# Patient Record
Sex: MALE | URBAN - METROPOLITAN AREA
[De-identification: names, ages, dates, MRNs, and addresses within clinical notes are randomized per-mention and may not be internally consistent; named-entity substitution may affect disease eponyms.]

---

## 2022-01-20 ENCOUNTER — APPOINTMENT (OUTPATIENT)
Dept: URBAN - METROPOLITAN AREA CLINIC 266 | Age: 47
Setting detail: DERMATOLOGY
End: 2022-01-20

## 2022-01-20 DIAGNOSIS — L73.0 ACNE KELOID: ICD-10-CM

## 2022-01-20 PROCEDURE — OTHER PRESCRIPTION: OTHER

## 2022-01-20 PROCEDURE — 99204 OFFICE O/P NEW MOD 45 MIN: CPT

## 2022-01-20 PROCEDURE — OTHER COUNSELING: OTHER

## 2022-01-20 PROCEDURE — OTHER PRESCRIPTION MEDICATION MANAGEMENT: OTHER

## 2022-01-20 PROCEDURE — OTHER MIPS QUALITY: OTHER

## 2022-01-20 RX ORDER — DOXYCYCLINE 40 MG/1
CAPSULE ORAL
Qty: 30 | Refills: 2 | Status: ERX | COMMUNITY
Start: 2022-01-20

## 2022-01-20 RX ORDER — CLINDAMYCIN PHOSPHATE 10 MG/ML
SOLUTION TOPICAL
Qty: 60 | Refills: 11 | Status: ERX | COMMUNITY
Start: 2022-01-20

## 2022-01-20 RX ORDER — TAZAROTENE 0.1 MG/G
CREAM CUTANEOUS
Qty: 1 | Refills: 3 | Status: CANCELLED | COMMUNITY
Start: 2022-01-20

## 2022-01-20 ASSESSMENT — LOCATION ZONE DERM: LOCATION ZONE: SCALP

## 2022-01-20 ASSESSMENT — LOCATION DETAILED DESCRIPTION DERM
LOCATION DETAILED: LEFT INFERIOR OCCIPITAL SCALP
LOCATION DETAILED: MID-OCCIPITAL SCALP
LOCATION DETAILED: RIGHT OCCIPITAL SCALP

## 2022-01-20 ASSESSMENT — LOCATION SIMPLE DESCRIPTION DERM: LOCATION SIMPLE: POSTERIOR SCALP

## 2022-01-20 NOTE — PROCEDURE: PRESCRIPTION MEDICATION MANAGEMENT
Render In Strict Bullet Format?: No
Plan: Accutane if fails
Detail Level: Zone
Initiate Treatment: Oracea daily \\nTazorac daily on neck \\nClindamycin solution daily

## 2022-02-02 ENCOUNTER — RX ONLY (RX ONLY)
Age: 47
End: 2022-02-02

## 2022-02-02 RX ORDER — TRETIONIN 1 MG/G
CREAM TOPICAL
Qty: 45 | Refills: 3 | Status: ERX | COMMUNITY
Start: 2022-02-02

## 2022-04-21 ENCOUNTER — RX ONLY (RX ONLY)
Age: 47
End: 2022-04-21

## 2022-04-21 ENCOUNTER — APPOINTMENT (OUTPATIENT)
Dept: URBAN - METROPOLITAN AREA CLINIC 266 | Age: 47
Setting detail: DERMATOLOGY
End: 2022-04-21

## 2022-04-21 DIAGNOSIS — L73.0 ACNE KELOID: ICD-10-CM

## 2022-04-21 DIAGNOSIS — L64.8 OTHER ANDROGENIC ALOPECIA: ICD-10-CM

## 2022-04-21 PROCEDURE — OTHER TREATMENT REGIMEN: OTHER

## 2022-04-21 PROCEDURE — OTHER COUNSELING: OTHER

## 2022-04-21 PROCEDURE — OTHER MIPS QUALITY: OTHER

## 2022-04-21 PROCEDURE — OTHER PRESCRIPTION MEDICATION MANAGEMENT: OTHER

## 2022-04-21 PROCEDURE — 99214 OFFICE O/P EST MOD 30 MIN: CPT

## 2022-04-21 ASSESSMENT — LOCATION DETAILED DESCRIPTION DERM
LOCATION DETAILED: MID-OCCIPITAL SCALP
LOCATION DETAILED: RIGHT OCCIPITAL SCALP
LOCATION DETAILED: LEFT INFERIOR OCCIPITAL SCALP

## 2022-04-21 ASSESSMENT — LOCATION ZONE DERM: LOCATION ZONE: SCALP

## 2022-04-21 ASSESSMENT — LOCATION SIMPLE DESCRIPTION DERM: LOCATION SIMPLE: POSTERIOR SCALP

## 2022-04-21 NOTE — PROCEDURE: PRESCRIPTION MEDICATION MANAGEMENT
Detail Level: Zone
Initiate Treatment: Minoxidil/Finasteride compound spray
Render In Strict Bullet Format?: No

## 2022-06-08 ENCOUNTER — RX ONLY (RX ONLY)
Age: 47
End: 2022-06-08

## 2022-06-08 RX ORDER — DOXYCYCLINE 40 MG/1
CAPSULE ORAL
Qty: 30 | Refills: 2 | Status: ERX

## 2022-06-10 ENCOUNTER — RX ONLY (RX ONLY)
Age: 47
End: 2022-06-10

## 2022-06-10 RX ORDER — TRETIONIN 1 MG/G
CREAM TOPICAL
Qty: 45 | Refills: 3 | Status: ERX

## 2022-08-18 NOTE — PROCEDURE: COUNSELING
-- DO NOT REPLY / DO NOT REPLY ALL --  -- Message is from [facility] ([facility]) --    General Patient Message   Patient mother called needs to get a form signed off stating that he is up to date  With shots    Please call when she can come drop off no appointments available  Or can or be sent in the yobany     Caller Information       Type Contact Phone/Fax    08/18/2022 11:56 AM CDT Phone (Incoming) DUENAS, THEE (Mother) [phone]        Alternative phone number:     Can a detailed message be left? Yes    Message Turnaround:     Did the caller agree that this message can wait until the office reopens in the morning? YES - The Message Can Wait - Send a message to the provider's clinical support pool     [place]:    Please give this turnaround time to the caller:   \"This message will be sent to [state Provider's name]. The clinical team will fulfill your request as soon as they review your message.\"                
Detail Level: Detailed
Patient Specific Counseling (Will Not Stick From Patient To Patient): Prp